# Patient Record
Sex: FEMALE | Race: BLACK OR AFRICAN AMERICAN | Employment: UNEMPLOYED | URBAN - METROPOLITAN AREA
[De-identification: names, ages, dates, MRNs, and addresses within clinical notes are randomized per-mention and may not be internally consistent; named-entity substitution may affect disease eponyms.]

---

## 2019-08-24 ENCOUNTER — HOSPITAL ENCOUNTER (EMERGENCY)
Age: 19
Discharge: HOME | End: 2019-08-24
Payer: MEDICAID

## 2019-08-24 VITALS
DIASTOLIC BLOOD PRESSURE: 71 MMHG | WEIGHT: 196 LBS | TEMPERATURE: 98.1 F | HEART RATE: 87 BPM | SYSTOLIC BLOOD PRESSURE: 123 MMHG | OXYGEN SATURATION: 97 % | RESPIRATION RATE: 18 BRPM | HEIGHT: 62 IN | BODY MASS INDEX: 36.07 KG/M2

## 2019-08-24 DIAGNOSIS — F41.1 ANXIETY STATE: Primary | ICD-10-CM

## 2019-08-24 PROCEDURE — 99282 EMERGENCY DEPT VISIT SF MDM: CPT

## 2019-08-24 RX ORDER — HYDROXYZINE PAMOATE 25 MG/1
25-50 CAPSULE ORAL 3 TIMES DAILY PRN
Qty: 20 CAPSULE | Refills: 0 | Status: SHIPPED | OUTPATIENT
Start: 2019-08-24 | End: 2019-09-07

## 2019-08-24 ASSESSMENT — ENCOUNTER SYMPTOMS
DIARRHEA: 0
RHINORRHEA: 0
WHEEZING: 0
SHORTNESS OF BREATH: 1
NAUSEA: 1
VOMITING: 0
ABDOMINAL PAIN: 0
COUGH: 0

## 2019-08-27 ENCOUNTER — HOSPITAL ENCOUNTER (EMERGENCY)
Age: 19
Discharge: HOME | End: 2019-08-27
Payer: MEDICAID

## 2019-08-27 ENCOUNTER — APPOINTMENT (OUTPATIENT)
Dept: GENERAL RADIOLOGY | Age: 19
End: 2019-08-27
Payer: MEDICAID

## 2019-08-27 VITALS
SYSTOLIC BLOOD PRESSURE: 123 MMHG | OXYGEN SATURATION: 100 % | BODY MASS INDEX: 35.42 KG/M2 | TEMPERATURE: 98.7 F | HEIGHT: 62 IN | DIASTOLIC BLOOD PRESSURE: 66 MMHG | HEART RATE: 66 BPM | RESPIRATION RATE: 20 BRPM | WEIGHT: 192.46 LBS

## 2019-08-27 DIAGNOSIS — R07.9 CHEST PAIN, UNSPECIFIED TYPE: Primary | ICD-10-CM

## 2019-08-27 PROCEDURE — 6370000000 HC RX 637 (ALT 250 FOR IP): Performed by: PHYSICIAN ASSISTANT

## 2019-08-27 PROCEDURE — 71046 X-RAY EXAM CHEST 2 VIEWS: CPT

## 2019-08-27 PROCEDURE — 99283 EMERGENCY DEPT VISIT LOW MDM: CPT

## 2019-08-27 PROCEDURE — 93005 ELECTROCARDIOGRAM TRACING: CPT | Performed by: EMERGENCY MEDICINE

## 2019-08-27 RX ORDER — IBUPROFEN 600 MG/1
600 TABLET ORAL EVERY 6 HOURS PRN
Qty: 20 TABLET | Refills: 0 | Status: SHIPPED | OUTPATIENT
Start: 2019-08-27

## 2019-08-27 RX ADMIN — IBUPROFEN 600 MG: 400 TABLET, FILM COATED ORAL at 19:04

## 2019-08-27 ASSESSMENT — PAIN SCALES - GENERAL
PAINLEVEL_OUTOF10: 5

## 2019-08-27 ASSESSMENT — PAIN DESCRIPTION - FREQUENCY
FREQUENCY: CONTINUOUS
FREQUENCY: CONTINUOUS

## 2019-08-27 ASSESSMENT — PAIN DESCRIPTION - LOCATION
LOCATION: NECK
LOCATION: CHEST

## 2019-08-27 ASSESSMENT — PAIN DESCRIPTION - PAIN TYPE
TYPE: ACUTE PAIN
TYPE: ACUTE PAIN

## 2019-08-27 ASSESSMENT — PAIN - FUNCTIONAL ASSESSMENT
PAIN_FUNCTIONAL_ASSESSMENT: PREVENTS OR INTERFERES SOME ACTIVE ACTIVITIES AND ADLS
PAIN_FUNCTIONAL_ASSESSMENT: ACTIVITIES ARE NOT PREVENTED

## 2019-08-27 ASSESSMENT — PAIN DESCRIPTION - PROGRESSION
CLINICAL_PROGRESSION: NOT CHANGED
CLINICAL_PROGRESSION: NOT CHANGED

## 2019-08-27 ASSESSMENT — PAIN DESCRIPTION - DESCRIPTORS
DESCRIPTORS: ACHING
DESCRIPTORS: SHARP

## 2019-08-27 ASSESSMENT — PAIN DESCRIPTION - ONSET: ONSET: PROGRESSIVE

## 2019-08-27 NOTE — ED TRIAGE NOTES
Pt arrived to ED via private vehicle. Pt presents with complaints of chest pain that radiates up the right side of her neck that began at 100 this morning. Pt states the pain started this morning and is described as an ache rated at a 5/10. Pt has not taken anything for pain. VS noted and stable. Patient A&Ox4. Respirations easy and unlabored. Skin warm and dry and appropriate for ethnicity. No acute distress noted at this time.

## 2019-08-27 NOTE — ED NOTES
Bed: B-08  Expected date:   Expected time:   Means of arrival:   Comments:  #1     Odessa Wong RN  08/27/19 0931

## 2019-08-28 LAB
EKG ATRIAL RATE: 68 BPM
EKG DIAGNOSIS: NORMAL
EKG P AXIS: 34 DEGREES
EKG P-R INTERVAL: 110 MS
EKG Q-T INTERVAL: 366 MS
EKG QRS DURATION: 74 MS
EKG QTC CALCULATION (BAZETT): 389 MS
EKG R AXIS: 45 DEGREES
EKG T AXIS: 44 DEGREES
EKG VENTRICULAR RATE: 68 BPM

## 2019-08-28 PROCEDURE — 93010 ELECTROCARDIOGRAM REPORT: CPT | Performed by: INTERNAL MEDICINE

## 2024-10-08 ENCOUNTER — OFFICE VISIT (OUTPATIENT)
Age: 24
End: 2024-10-08

## 2024-10-08 VITALS
HEART RATE: 51 BPM | BODY MASS INDEX: 38.53 KG/M2 | TEMPERATURE: 98.2 F | HEIGHT: 62 IN | DIASTOLIC BLOOD PRESSURE: 67 MMHG | WEIGHT: 209.4 LBS | OXYGEN SATURATION: 97 % | SYSTOLIC BLOOD PRESSURE: 105 MMHG

## 2024-10-08 DIAGNOSIS — M65.4 DE QUERVAIN'S TENOSYNOVITIS, LEFT: Primary | ICD-10-CM

## 2024-10-08 ASSESSMENT — ENCOUNTER SYMPTOMS
SHORTNESS OF BREATH: 0
VOMITING: 0
NAUSEA: 0
ABDOMINAL PAIN: 0

## 2024-10-08 NOTE — PROGRESS NOTES
Silvana Ho (:  2000) is a 24 y.o. female, New patient, here for evaluation of the following chief complaint(s):  Wrist Pain (Pt c/o her left wrist and thump pain for about a 3 month.)    ASSESSMENT/PLAN:    ICD-10-CM    1. De Quervain's tenosynovitis, left  M65.4 [facility] Wrist Brace, Left     [facility], Sushma PA, Orthopedic Surgery, [facility]        Ddx includes: DeQuervain Tenosynovitis, Carpal tunnel syndrome, Arthritis  Disposition: Pt is 25yo female here with c/o left wrist pain. VSS. Physical Exam as below. No s/s of fracture or infection noted. Positive Finkelstein test noted. Left thumb spica applied in office today. Neurovascular intact pre and post application. Ibuprofen and ice advised. Discussed s/s that will warrant reevaluation. [facility] referral given.   Reviewed AVS with patient. All questions answered. If symptoms worsen go to the Emergency Department. Follow up in clinic or with PCP as needed. Patient is agreeable with plan.     SUBJECTIVE/OBJECTIVE:  25yo female here with c/o left wrist pain [id number]. Denies any injury, trauma or surgery to this wrist. Denies any other systemic symptoms including numbness or tingling. Denies care prior to  arrival. Reports she has 2 young children and a  at home. Right hand dominant.       History provided by:  Patient   used: No      Vitals:    10/08/24 1408   BP: 105/67   Pulse: 51   Temp: 98.2 °F (36.8 °C)   TempSrc: Oral   SpO2: 97%   Weight: 95 kg (209 lb 6.4 oz)   Height: 1.575 m (5' 2\")     Review of Systems   Constitutional:  Negative for fever.   Respiratory:  Negative for shortness of breath.    Gastrointestinal:  Negative for abdominal pain, nausea and vomiting.   Musculoskeletal:  Positive for arthralgias (lt wrist pain).   Neurological:  Negative for numbness.     Physical Exam  Constitutional:       General: She is not in acute distress.     Appearance: She is not

## 2025-01-28 ENCOUNTER — OFFICE VISIT (OUTPATIENT)
Age: 25
End: 2025-01-28

## 2025-01-28 VITALS
SYSTOLIC BLOOD PRESSURE: 102 MMHG | RESPIRATION RATE: 16 BRPM | DIASTOLIC BLOOD PRESSURE: 68 MMHG | OXYGEN SATURATION: 98 % | WEIGHT: 196 LBS | BODY MASS INDEX: 35.85 KG/M2 | TEMPERATURE: 98.7 F | HEART RATE: 76 BPM

## 2025-01-28 DIAGNOSIS — M25.532 LEFT WRIST PAIN: Primary | ICD-10-CM
